# Patient Record
Sex: MALE | Race: WHITE | NOT HISPANIC OR LATINO | URBAN - METROPOLITAN AREA
[De-identification: names, ages, dates, MRNs, and addresses within clinical notes are randomized per-mention and may not be internally consistent; named-entity substitution may affect disease eponyms.]

---

## 2018-10-08 ENCOUNTER — OFFICE (AMBULATORY)
Dept: URBAN - METROPOLITAN AREA CLINIC 64 | Facility: CLINIC | Age: 78
End: 2018-10-08

## 2018-10-08 VITALS
WEIGHT: 268 LBS | HEIGHT: 71 IN | HEART RATE: 73 BPM | DIASTOLIC BLOOD PRESSURE: 72 MMHG | SYSTOLIC BLOOD PRESSURE: 138 MMHG

## 2018-10-08 DIAGNOSIS — Z85.038 PERSONAL HISTORY OF OTHER MALIGNANT NEOPLASM OF LARGE INTEST: ICD-10-CM

## 2018-10-08 PROCEDURE — 99214 OFFICE O/P EST MOD 30 MIN: CPT | Performed by: INTERNAL MEDICINE

## 2020-07-20 ENCOUNTER — TRANSCRIBE ORDERS (OUTPATIENT)
Dept: ADMINISTRATIVE | Facility: HOSPITAL | Age: 80
End: 2020-07-20

## 2020-07-20 DIAGNOSIS — M79.605 BILATERAL LEG PAIN: Primary | ICD-10-CM

## 2020-07-20 DIAGNOSIS — M79.604 BILATERAL LEG PAIN: Primary | ICD-10-CM

## 2021-11-02 ENCOUNTER — OFFICE (AMBULATORY)
Dept: URBAN - METROPOLITAN AREA CLINIC 64 | Facility: CLINIC | Age: 81
End: 2021-11-02

## 2021-11-02 VITALS
WEIGHT: 277 LBS | SYSTOLIC BLOOD PRESSURE: 102 MMHG | DIASTOLIC BLOOD PRESSURE: 50 MMHG | HEART RATE: 76 BPM | HEIGHT: 71 IN

## 2021-11-02 DIAGNOSIS — K57.30 DIVERTICULOSIS OF LARGE INTESTINE WITHOUT PERFORATION OR ABS: ICD-10-CM

## 2021-11-02 DIAGNOSIS — Z85.038 PERSONAL HISTORY OF OTHER MALIGNANT NEOPLASM OF LARGE INTEST: ICD-10-CM

## 2021-11-02 PROCEDURE — 99204 OFFICE O/P NEW MOD 45 MIN: CPT | Performed by: INTERNAL MEDICINE

## 2021-12-17 ENCOUNTER — ON CAMPUS - OUTPATIENT (AMBULATORY)
Dept: URBAN - METROPOLITAN AREA HOSPITAL 2 | Facility: HOSPITAL | Age: 81
End: 2021-12-17
Payer: COMMERCIAL

## 2021-12-17 VITALS
OXYGEN SATURATION: 96 % | SYSTOLIC BLOOD PRESSURE: 86 MMHG | SYSTOLIC BLOOD PRESSURE: 84 MMHG | SYSTOLIC BLOOD PRESSURE: 91 MMHG | SYSTOLIC BLOOD PRESSURE: 92 MMHG | SYSTOLIC BLOOD PRESSURE: 90 MMHG | DIASTOLIC BLOOD PRESSURE: 59 MMHG | TEMPERATURE: 97.1 F | HEART RATE: 83 BPM | DIASTOLIC BLOOD PRESSURE: 55 MMHG | OXYGEN SATURATION: 94 % | WEIGHT: 277 LBS | SYSTOLIC BLOOD PRESSURE: 126 MMHG | HEART RATE: 85 BPM | DIASTOLIC BLOOD PRESSURE: 53 MMHG | OXYGEN SATURATION: 95 % | HEART RATE: 89 BPM | SYSTOLIC BLOOD PRESSURE: 133 MMHG | HEART RATE: 92 BPM | DIASTOLIC BLOOD PRESSURE: 80 MMHG | HEART RATE: 82 BPM | DIASTOLIC BLOOD PRESSURE: 56 MMHG | HEIGHT: 71 IN | RESPIRATION RATE: 16 BRPM | OXYGEN SATURATION: 97 % | HEART RATE: 84 BPM | RESPIRATION RATE: 18 BRPM | DIASTOLIC BLOOD PRESSURE: 57 MMHG | DIASTOLIC BLOOD PRESSURE: 60 MMHG | DIASTOLIC BLOOD PRESSURE: 71 MMHG | SYSTOLIC BLOOD PRESSURE: 101 MMHG | DIASTOLIC BLOOD PRESSURE: 69 MMHG | SYSTOLIC BLOOD PRESSURE: 97 MMHG

## 2021-12-17 DIAGNOSIS — Z85.038 PERSONAL HISTORY OF OTHER MALIGNANT NEOPLASM OF LARGE INTEST: ICD-10-CM

## 2021-12-17 DIAGNOSIS — K57.30 DIVERTICULOSIS OF LARGE INTESTINE WITHOUT PERFORATION OR ABS: ICD-10-CM

## 2021-12-17 PROCEDURE — 45378 DIAGNOSTIC COLONOSCOPY: CPT | Mod: 33 | Performed by: INTERNAL MEDICINE

## 2024-05-25 ENCOUNTER — HOSPITAL ENCOUNTER (OUTPATIENT)
Facility: HOSPITAL | Age: 84
Setting detail: OBSERVATION
Discharge: HOME | End: 2024-05-27
Attending: EMERGENCY MEDICINE | Admitting: INTERNAL MEDICINE
Payer: COMMERCIAL

## 2024-05-25 DIAGNOSIS — R55 SYNCOPE AND COLLAPSE: Primary | ICD-10-CM

## 2024-05-25 LAB
ALBUMIN SERPL-MCNC: 4.1 G/DL (ref 3.5–5.2)
ALBUMIN/GLOB SERPL: 1.5 G/DL
ALP SERPL-CCNC: 88 U/L (ref 39–117)
ALT SERPL W P-5'-P-CCNC: 8 U/L (ref 1–41)
ANION GAP SERPL CALCULATED.3IONS-SCNC: 11.6 MMOL/L (ref 5–15)
AST SERPL-CCNC: 14 U/L (ref 1–40)
BASOPHILS # BLD AUTO: 0.02 10*3/MM3 (ref 0–0.2)
BASOPHILS NFR BLD AUTO: 0.3 % (ref 0–1.5)
BILIRUB SERPL-MCNC: 0.6 MG/DL (ref 0–1.2)
BUN SERPL-MCNC: 21 MG/DL (ref 8–23)
BUN/CREAT SERPL: 19.4 (ref 7–25)
CALCIUM SPEC-SCNC: 10.3 MG/DL (ref 8.6–10.5)
CHLORIDE SERPL-SCNC: 101 MMOL/L (ref 98–107)
CO2 SERPL-SCNC: 24.4 MMOL/L (ref 22–29)
CREAT SERPL-MCNC: 1.08 MG/DL (ref 0.76–1.27)
DEPRECATED RDW RBC AUTO: 47.1 FL (ref 37–54)
EGFRCR SERPLBLD CKD-EPI 2021: 68.1 ML/MIN/1.73
EOSINOPHIL # BLD AUTO: 0.14 10*3/MM3 (ref 0–0.4)
EOSINOPHIL NFR BLD AUTO: 2.3 % (ref 0.3–6.2)
ERYTHROCYTE [DISTWIDTH] IN BLOOD BY AUTOMATED COUNT: 13.8 % (ref 12.3–15.4)
GEN 5 2HR TROPONIN T REFLEX: 20 NG/L
GLOBULIN UR ELPH-MCNC: 2.8 GM/DL
GLUCOSE BLDC GLUCOMTR-MCNC: 184 MG/DL (ref 70–105)
GLUCOSE SERPL-MCNC: 113 MG/DL (ref 65–99)
HCT VFR BLD AUTO: 41.8 % (ref 37.5–51)
HGB BLD-MCNC: 13.3 G/DL (ref 13–17.7)
IMM GRANULOCYTES # BLD AUTO: 0.02 10*3/MM3 (ref 0–0.05)
IMM GRANULOCYTES NFR BLD AUTO: 0.3 % (ref 0–0.5)
LYMPHOCYTES # BLD AUTO: 1.24 10*3/MM3 (ref 0.7–3.1)
LYMPHOCYTES NFR BLD AUTO: 20.3 % (ref 19.6–45.3)
MCH RBC QN AUTO: 29.8 PG (ref 26.6–33)
MCHC RBC AUTO-ENTMCNC: 31.8 G/DL (ref 31.5–35.7)
MCV RBC AUTO: 93.5 FL (ref 79–97)
MONOCYTES # BLD AUTO: 0.64 10*3/MM3 (ref 0.1–0.9)
MONOCYTES NFR BLD AUTO: 10.5 % (ref 5–12)
NEUTROPHILS NFR BLD AUTO: 4.05 10*3/MM3 (ref 1.7–7)
NEUTROPHILS NFR BLD AUTO: 66.3 % (ref 42.7–76)
NRBC BLD AUTO-RTO: 0 /100 WBC (ref 0–0.2)
NT-PROBNP SERPL-MCNC: 85.2 PG/ML (ref 0–1800)
PLATELET # BLD AUTO: 222 10*3/MM3 (ref 140–450)
PMV BLD AUTO: 11.4 FL (ref 6–12)
POTASSIUM SERPL-SCNC: 3.9 MMOL/L (ref 3.5–5.2)
PROT SERPL-MCNC: 6.9 G/DL (ref 6–8.5)
RBC # BLD AUTO: 4.47 10*6/MM3 (ref 4.14–5.8)
SODIUM SERPL-SCNC: 137 MMOL/L (ref 136–145)
TROPONIN T DELTA: -1 NG/L
TROPONIN T SERPL HS-MCNC: 21 NG/L
WBC NRBC COR # BLD AUTO: 6.11 10*3/MM3 (ref 3.4–10.8)

## 2024-05-25 PROCEDURE — 83880 ASSAY OF NATRIURETIC PEPTIDE: CPT | Performed by: EMERGENCY MEDICINE

## 2024-05-25 PROCEDURE — G0378 HOSPITAL OBSERVATION PER HR: HCPCS

## 2024-05-25 PROCEDURE — 85025 COMPLETE CBC W/AUTO DIFF WBC: CPT | Performed by: EMERGENCY MEDICINE

## 2024-05-25 PROCEDURE — 93005 ELECTROCARDIOGRAM TRACING: CPT | Performed by: EMERGENCY MEDICINE

## 2024-05-25 PROCEDURE — 82948 REAGENT STRIP/BLOOD GLUCOSE: CPT

## 2024-05-25 PROCEDURE — 96372 THER/PROPH/DIAG INJ SC/IM: CPT

## 2024-05-25 PROCEDURE — 80053 COMPREHEN METABOLIC PANEL: CPT | Performed by: EMERGENCY MEDICINE

## 2024-05-25 PROCEDURE — 36415 COLL VENOUS BLD VENIPUNCTURE: CPT

## 2024-05-25 PROCEDURE — 93005 ELECTROCARDIOGRAM TRACING: CPT

## 2024-05-25 PROCEDURE — 99285 EMERGENCY DEPT VISIT HI MDM: CPT

## 2024-05-25 PROCEDURE — 84484 ASSAY OF TROPONIN QUANT: CPT | Performed by: EMERGENCY MEDICINE

## 2024-05-25 PROCEDURE — 25010000002 ENOXAPARIN PER 10 MG: Performed by: NURSE PRACTITIONER

## 2024-05-25 RX ORDER — DILTIAZEM HYDROCHLORIDE 240 MG/1
240 CAPSULE, COATED, EXTENDED RELEASE ORAL
Status: DISCONTINUED | OUTPATIENT
Start: 2024-05-26 | End: 2024-05-27

## 2024-05-25 RX ORDER — AMOXICILLIN 250 MG
2 CAPSULE ORAL 2 TIMES DAILY PRN
Status: DISCONTINUED | OUTPATIENT
Start: 2024-05-25 | End: 2024-05-27 | Stop reason: HOSPADM

## 2024-05-25 RX ORDER — POLYETHYLENE GLYCOL 3350 17 G/17G
17 POWDER, FOR SOLUTION ORAL DAILY PRN
Status: DISCONTINUED | OUTPATIENT
Start: 2024-05-25 | End: 2024-05-27 | Stop reason: HOSPADM

## 2024-05-25 RX ORDER — HYDROCHLOROTHIAZIDE 25 MG/1
25 TABLET ORAL DAILY
Status: DISCONTINUED | OUTPATIENT
Start: 2024-05-26 | End: 2024-05-26

## 2024-05-25 RX ORDER — INSULIN LISPRO 100 [IU]/ML
2-9 INJECTION, SOLUTION INTRAVENOUS; SUBCUTANEOUS
Status: DISCONTINUED | OUTPATIENT
Start: 2024-05-26 | End: 2024-05-26

## 2024-05-25 RX ORDER — NICOTINE POLACRILEX 4 MG
15 LOZENGE BUCCAL
Status: DISCONTINUED | OUTPATIENT
Start: 2024-05-25 | End: 2024-05-27 | Stop reason: HOSPADM

## 2024-05-25 RX ORDER — LISINOPRIL 20 MG/1
20 TABLET ORAL DAILY
Status: DISCONTINUED | OUTPATIENT
Start: 2024-05-26 | End: 2024-05-27

## 2024-05-25 RX ORDER — OMEPRAZOLE 40 MG/1
40 CAPSULE, DELAYED RELEASE ORAL DAILY
COMMUNITY

## 2024-05-25 RX ORDER — HYDROCHLOROTHIAZIDE 25 MG/1
25 TABLET ORAL DAILY
COMMUNITY
End: 2024-05-27 | Stop reason: HOSPADM

## 2024-05-25 RX ORDER — NITROGLYCERIN 0.4 MG/1
0.4 TABLET SUBLINGUAL
Status: DISCONTINUED | OUTPATIENT
Start: 2024-05-25 | End: 2024-05-27 | Stop reason: HOSPADM

## 2024-05-25 RX ORDER — GABAPENTIN 400 MG/1
400 CAPSULE ORAL 2 TIMES DAILY
COMMUNITY
End: 2024-05-27 | Stop reason: HOSPADM

## 2024-05-25 RX ORDER — ACETAMINOPHEN 325 MG/1
650 TABLET ORAL EVERY 4 HOURS PRN
Status: DISCONTINUED | OUTPATIENT
Start: 2024-05-25 | End: 2024-05-27 | Stop reason: HOSPADM

## 2024-05-25 RX ORDER — TAMSULOSIN HYDROCHLORIDE 0.4 MG/1
1 CAPSULE ORAL DAILY
COMMUNITY

## 2024-05-25 RX ORDER — DILTIAZEM HYDROCHLORIDE 240 MG/1
240 CAPSULE, EXTENDED RELEASE ORAL DAILY
COMMUNITY
End: 2024-05-27 | Stop reason: HOSPADM

## 2024-05-25 RX ORDER — DEXTROSE MONOHYDRATE 25 G/50ML
25 INJECTION, SOLUTION INTRAVENOUS
Status: DISCONTINUED | OUTPATIENT
Start: 2024-05-25 | End: 2024-05-27 | Stop reason: HOSPADM

## 2024-05-25 RX ORDER — TAMSULOSIN HYDROCHLORIDE 0.4 MG/1
0.4 CAPSULE ORAL DAILY
Status: DISCONTINUED | OUTPATIENT
Start: 2024-05-26 | End: 2024-05-26

## 2024-05-25 RX ORDER — SODIUM CHLORIDE 0.9 % (FLUSH) 0.9 %
10 SYRINGE (ML) INJECTION AS NEEDED
Status: DISCONTINUED | OUTPATIENT
Start: 2024-05-25 | End: 2024-05-27 | Stop reason: HOSPADM

## 2024-05-25 RX ORDER — ONDANSETRON 4 MG/1
4 TABLET, ORALLY DISINTEGRATING ORAL EVERY 6 HOURS PRN
Status: DISCONTINUED | OUTPATIENT
Start: 2024-05-25 | End: 2024-05-27 | Stop reason: HOSPADM

## 2024-05-25 RX ORDER — BISACODYL 10 MG
10 SUPPOSITORY, RECTAL RECTAL DAILY PRN
Status: DISCONTINUED | OUTPATIENT
Start: 2024-05-25 | End: 2024-05-27 | Stop reason: HOSPADM

## 2024-05-25 RX ORDER — ENOXAPARIN SODIUM 100 MG/ML
40 INJECTION SUBCUTANEOUS DAILY
Status: DISCONTINUED | OUTPATIENT
Start: 2024-05-25 | End: 2024-05-27 | Stop reason: HOSPADM

## 2024-05-25 RX ORDER — BISACODYL 5 MG/1
5 TABLET, DELAYED RELEASE ORAL DAILY PRN
Status: DISCONTINUED | OUTPATIENT
Start: 2024-05-25 | End: 2024-05-27 | Stop reason: HOSPADM

## 2024-05-25 RX ORDER — SODIUM CHLORIDE 0.9 % (FLUSH) 0.9 %
10 SYRINGE (ML) INJECTION EVERY 12 HOURS SCHEDULED
Status: DISCONTINUED | OUTPATIENT
Start: 2024-05-25 | End: 2024-05-27 | Stop reason: HOSPADM

## 2024-05-25 RX ORDER — IBUPROFEN 600 MG/1
1 TABLET ORAL
Status: DISCONTINUED | OUTPATIENT
Start: 2024-05-25 | End: 2024-05-27 | Stop reason: HOSPADM

## 2024-05-25 RX ORDER — PANTOPRAZOLE SODIUM 40 MG/1
40 TABLET, DELAYED RELEASE ORAL
Status: DISCONTINUED | OUTPATIENT
Start: 2024-05-26 | End: 2024-05-27 | Stop reason: HOSPADM

## 2024-05-25 RX ORDER — SODIUM CHLORIDE 9 MG/ML
40 INJECTION, SOLUTION INTRAVENOUS AS NEEDED
Status: DISCONTINUED | OUTPATIENT
Start: 2024-05-25 | End: 2024-05-27 | Stop reason: HOSPADM

## 2024-05-25 RX ORDER — GABAPENTIN 400 MG/1
400 CAPSULE ORAL 2 TIMES DAILY
Status: DISCONTINUED | OUTPATIENT
Start: 2024-05-26 | End: 2024-05-26

## 2024-05-25 RX ORDER — LISINOPRIL 20 MG/1
20 TABLET ORAL DAILY
COMMUNITY
End: 2024-05-27 | Stop reason: HOSPADM

## 2024-05-25 RX ADMIN — ENOXAPARIN SODIUM 40 MG: 100 INJECTION SUBCUTANEOUS at 19:10

## 2024-05-25 NOTE — ED NOTES
Pt reports multiple syncopal episodes over the past couple days, just had one today. Pts brother caught pt and sat him down before he lost consciousness. Pts family at bedside denies hitting head. Pt states he takes ASA but denies other blood thinner use.

## 2024-05-25 NOTE — Clinical Note
Level of Care: Telemetry [5]   Admitting Physician: IAIN CAMPBELL [[id number]]   Attending Physician: IAIN CAMPBELL [[id number]]

## 2024-05-25 NOTE — ED PROVIDER NOTES
"Subjective   History of Present Illness  84-year-old male with history of CAD and CHF presents for 3 syncopal episodes last 4 days.  Most recent 1 this morning.  Blood pressure 80/40 per EMS when they arrived.  Improved without issue.  Reportedly it was not breathing yesterday or today when it occurred.  States he has a history of proximal A-fib as well but does not have any chest pain shortness of breath or palpitations.  Review of Systems  HPI  No past medical history on file.    Allergies   Allergen Reactions    Hydrocodone Unknown - High Severity    Tetanus Toxoids Unknown - High Severity       No past surgical history on file.    No family history on file.    Social History     Socioeconomic History    Marital status:            Objective   Physical Exam  Constitutional:  No acute distress.  Head:  Atraumatic.  Normocephalic.   Eyes:  No scleral icterus. Normal conjunctivae  ENT:  Moist mucosa.  No nasal discharge present.  Cardiovascular:  Well perfused.  Equal pulses.  Regular rhythm and normal rate.  Normal capillary refill.    Pulmonary/Chest:  No respiratory distress.  Airway patent.  No tachypnea.  No accessory muscle usage.  Clear to station bilaterally  Abdominal:  Nondistended. Nontender.   Extremities: Trace peripheral edema.  No Deformity  Skin:  Warm, dry  Neurological:  Alert, awake, and appropriate.  Normal speech.      Procedures           ED Course      /59 (Patient Position: Standing)   Pulse 84   Temp 97.3 °F (36.3 °C) (Oral)   Resp 17   Ht 180.3 cm (71\")   Wt 109 kg (240 lb)   SpO2 92%   BMI 33.47 kg/m²   Labs Reviewed   COMPREHENSIVE METABOLIC PANEL - Abnormal; Notable for the following components:       Result Value    Glucose 113 (*)     All other components within normal limits    Narrative:     GFR Normal >60  Chronic Kidney Disease <60  Kidney Failure <15    The GFR formula is only valid for adults with stable renal function between ages 18 and 70.   TROPONIN - " Normal    Narrative:     High Sensitive Troponin T Reference Range:  <14.0 ng/L- Negative Female for AMI  <22.0 ng/L- Negative Male for AMI  >=14 - Abnormal Female indicating possible myocardial injury.  >=22 - Abnormal Male indicating possible myocardial injury.   Clinicians would have to utilize clinical acumen, EKG, Troponin, and serial changes to determine if it is an Acute Myocardial Infarction or myocardial injury due to an underlying chronic condition.        CBC WITH AUTO DIFFERENTIAL - Normal   BNP (IN-HOUSE) - Normal    Narrative:     This assay is used as an aid in the diagnosis of individuals suspected of having heart failure. It can be used as an aid in the diagnosis of acute decompensated heart failure (ADHF) in patients presenting with signs and symptoms of ADHF to the emergency department (ED). In addition, NT-proBNP of <300 pg/mL indicates ADHF is not likely.    Age Range Result Interpretation  NT-proBNP Concentration (pg/mL:      <50             Positive            >450                   Gray                 300-450                    Negative             <300    50-75           Positive            >900                  Gray                300-900                  Negative            <300      >75             Positive            >1800                  Gray                300-1800                  Negative            <300   HIGH SENSITIVITIY TROPONIN T 2HR   CBC AND DIFFERENTIAL    Narrative:     The following orders were created for panel order CBC & Differential.  Procedure                               Abnormality         Status                     ---------                               -----------         ------                     CBC Auto Differential[[id number]]        Normal              Final result                 Please view results for these tests on the individual orders.     Medications   sodium chloride 0.9 % flush 10 mL (has no administration in time range)     No radiology results  for the last day                                         Medical Decision Making  Amount and/or Complexity of Data Reviewed  Labs: ordered.  ECG/medicine tests: ordered.    Risk  Prescription drug management.    EKG interpretation: 3:47 PM, rate 76 normal sinus rhythm, first-degree block, normal axis, normal QTc, no acute ischemic changes.    Negative orthostatics.  Reassuring labs.  Given history of CHF patient high risk for both arrhythmia and structural heart disease.  Excepted by Radha thompson to [employer] service    Final diagnoses:   Syncope and collapse       ED Disposition  ED Disposition       ED Disposition   Decision to Admit    Condition   --    Comment   Level of Care: Telemetry [5]   Admitting Physician: IAIN CAMPBELL [[id number]]   Attending Physician: IAIN CAMPBELL [[id number]]                 No follow-up provider specified.       Medication List      No changes were made to your prescriptions during this visit.            Dariel Mcgregor MD  05/25/24 [id number]

## 2024-05-26 ENCOUNTER — APPOINTMENT (OUTPATIENT)
Dept: GENERAL RADIOLOGY | Facility: HOSPITAL | Age: 84
End: 2024-05-26
Payer: COMMERCIAL

## 2024-05-26 PROBLEM — I25.10 CORONARY ARTERY DISEASE: Status: ACTIVE | Noted: 2024-05-26

## 2024-05-26 PROBLEM — M17.12 ARTHRITIS OF KNEE, LEFT: Status: ACTIVE | Noted: 2023-11-29

## 2024-05-26 LAB
BACTERIA UR QL AUTO: NORMAL /HPF
BILIRUB UR QL STRIP: NEGATIVE
CLARITY UR: CLEAR
COLOR UR: YELLOW
GLUCOSE BLDC GLUCOMTR-MCNC: 109 MG/DL (ref 70–105)
GLUCOSE BLDC GLUCOMTR-MCNC: 120 MG/DL (ref 70–105)
GLUCOSE BLDC GLUCOMTR-MCNC: 146 MG/DL (ref 70–105)
GLUCOSE BLDC GLUCOMTR-MCNC: 161 MG/DL (ref 70–105)
GLUCOSE UR STRIP-MCNC: NEGATIVE MG/DL
HGB UR QL STRIP.AUTO: NEGATIVE
HYALINE CASTS UR QL AUTO: NORMAL /LPF
KETONES UR QL STRIP: NEGATIVE
LEUKOCYTE ESTERASE UR QL STRIP.AUTO: ABNORMAL
NITRITE UR QL STRIP: NEGATIVE
PH UR STRIP.AUTO: 6.5 [PH] (ref 5–8)
PROT UR QL STRIP: NEGATIVE
QT INTERVAL: 388 MS
QTC INTERVAL: 436 MS
RBC # UR STRIP: NORMAL /HPF
REF LAB TEST METHOD: NORMAL
SP GR UR STRIP: 1.02 (ref 1–1.03)
SQUAMOUS #/AREA URNS HPF: NORMAL /HPF
UROBILINOGEN UR QL STRIP: ABNORMAL
WBC # UR STRIP: NORMAL /HPF

## 2024-05-26 PROCEDURE — 81001 URINALYSIS AUTO W/SCOPE: CPT | Performed by: FAMILY MEDICINE

## 2024-05-26 PROCEDURE — 25010000002 ENOXAPARIN PER 10 MG: Performed by: NURSE PRACTITIONER

## 2024-05-26 PROCEDURE — 82948 REAGENT STRIP/BLOOD GLUCOSE: CPT

## 2024-05-26 PROCEDURE — G0378 HOSPITAL OBSERVATION PER HR: HCPCS

## 2024-05-26 PROCEDURE — 82948 REAGENT STRIP/BLOOD GLUCOSE: CPT | Performed by: NURSE PRACTITIONER

## 2024-05-26 PROCEDURE — 63710000001 INSULIN LISPRO (HUMAN) PER 5 UNITS: Performed by: NURSE PRACTITIONER

## 2024-05-26 PROCEDURE — 71045 X-RAY EXAM CHEST 1 VIEW: CPT

## 2024-05-26 PROCEDURE — 25810000003 SODIUM CHLORIDE 0.9 % SOLUTION: Performed by: FAMILY MEDICINE

## 2024-05-26 PROCEDURE — 96372 THER/PROPH/DIAG INJ SC/IM: CPT

## 2024-05-26 PROCEDURE — 99204 OFFICE O/P NEW MOD 45 MIN: CPT | Performed by: STUDENT IN AN ORGANIZED HEALTH CARE EDUCATION/TRAINING PROGRAM

## 2024-05-26 RX ORDER — GABAPENTIN 100 MG/1
100 CAPSULE ORAL 2 TIMES DAILY
Status: DISCONTINUED | OUTPATIENT
Start: 2024-05-26 | End: 2024-05-27 | Stop reason: HOSPADM

## 2024-05-26 RX ORDER — ASPIRIN 81 MG/1
81 TABLET ORAL DAILY
Status: DISCONTINUED | OUTPATIENT
Start: 2024-05-26 | End: 2024-05-27 | Stop reason: HOSPADM

## 2024-05-26 RX ORDER — SODIUM CHLORIDE 9 MG/ML
20 INJECTION, SOLUTION INTRAVENOUS CONTINUOUS
Status: DISCONTINUED | OUTPATIENT
Start: 2024-05-26 | End: 2024-05-27 | Stop reason: HOSPADM

## 2024-05-26 RX ORDER — INSULIN LISPRO 100 [IU]/ML
2-9 INJECTION, SOLUTION INTRAVENOUS; SUBCUTANEOUS
Status: DISCONTINUED | OUTPATIENT
Start: 2024-05-26 | End: 2024-05-27 | Stop reason: HOSPADM

## 2024-05-26 RX ADMIN — SODIUM CHLORIDE 20 ML/HR: 9 INJECTION, SOLUTION INTRAVENOUS at 10:10

## 2024-05-26 RX ADMIN — LISINOPRIL 20 MG: 20 TABLET ORAL at 09:23

## 2024-05-26 RX ADMIN — Medication 10 ML: at 09:24

## 2024-05-26 RX ADMIN — GABAPENTIN 100 MG: 100 CAPSULE ORAL at 21:25

## 2024-05-26 RX ADMIN — ENOXAPARIN SODIUM 40 MG: 100 INJECTION SUBCUTANEOUS at 17:39

## 2024-05-26 RX ADMIN — GABAPENTIN 400 MG: 400 CAPSULE ORAL at 00:25

## 2024-05-26 RX ADMIN — DILTIAZEM HYDROCHLORIDE 240 MG: 240 CAPSULE, EXTENDED RELEASE ORAL at 09:23

## 2024-05-26 RX ADMIN — INSULIN LISPRO 2 UNITS: 100 INJECTION, SOLUTION INTRAVENOUS; SUBCUTANEOUS at 11:56

## 2024-05-26 RX ADMIN — Medication 10 ML: at 00:25

## 2024-05-26 RX ADMIN — ASPIRIN 81 MG: 81 TABLET, COATED ORAL at 17:39

## 2024-05-26 RX ADMIN — GABAPENTIN 100 MG: 100 CAPSULE ORAL at 09:23

## 2024-05-26 RX ADMIN — INSULIN LISPRO 2 UNITS: 100 INJECTION, SOLUTION INTRAVENOUS; SUBCUTANEOUS at 00:24

## 2024-05-26 RX ADMIN — Medication 10 ML: at 21:25

## 2024-05-26 RX ADMIN — PANTOPRAZOLE SODIUM 40 MG: 40 TABLET, DELAYED RELEASE ORAL at 06:34

## 2024-05-26 RX ADMIN — HYDROCHLOROTHIAZIDE 25 MG: 25 TABLET ORAL at 09:23

## 2024-05-26 RX ADMIN — METFORMIN HYDROCHLORIDE 500 MG: 500 TABLET ORAL at 10:02

## 2024-05-26 RX ADMIN — METFORMIN HYDROCHLORIDE 500 MG: 500 TABLET ORAL at 17:39

## 2024-05-26 NOTE — PLAN OF CARE
Goal Outcome Evaluation:         Pt is transferring to the room 357. Reported to the nurse there.

## 2024-05-26 NOTE — PROGRESS NOTES
Referring Provider: Daniel Coates MD        History of present illness:    Pleasant 84-year-old gentleman with a past medical history of coronary artery disease status post CABG in [place] about 7 years back, history of A-fib status post Maze procedure on full dose aspirin, hypertension presented to [facility] in setting of recurrent syncope  Patient follows with Dr. Tillman and had seen him about a year back.  No recent medication changes or viral illness.  Reports episode of feeling headache in the occipital region which radiated to the front along with vision changes and subsequently feeling of passing out.  Had a similar episode while at home sitting on the table.  Unclear if he actually lost consciousness however does not remember much from the incident  Blood pressure was in the 80s on evaluation    Review of systems: Negative unless as noted in HPI    Personal History:      History reviewed. No pertinent past medical history.    History reviewed. No pertinent surgical history.    History reviewed. No pertinent family history.    Social History     Tobacco Use    Smoking status: Never     Passive exposure: Never    Smokeless tobacco: Never   Vaping Use    Vaping status: Never Used   Substance Use Topics    Alcohol use: Never    Drug use: Never        Home meds:  Prior to Admission medications    Medication Sig Start Date End Date Taking? Authorizing Provider   dilTIAZem XR (DILACOR XR) 240 MG 24 hr capsule Take 1 capsule by mouth Daily.   Yes ProviderLoly MD   gabapentin (NEURONTIN) 400 MG capsule Take 1 capsule by mouth 2 (Two) Times a Day.   Yes ProviderLoly MD   hydroCHLOROthiazide 25 MG tablet Take 1 tablet by mouth Daily.   Yes ProviderLoly MD   lisinopril (PRINIVIL,ZESTRIL) 20 MG tablet Take 1 tablet by mouth Daily.   Yes ProviderLoly MD   metFORMIN (GLUCOPHAGE) 500 MG tablet Take 1 tablet by mouth 2 (Two) Times a Day With Meals.   Yes Provider  "Historical, MD   omeprazole (priLOSEC) 40 MG capsule Take 1 capsule by mouth Daily.   Yes Provider, MD Loly   tamsulosin (FLOMAX) 0.4 MG capsule 24 hr capsule Take 1 capsule by mouth Daily.   Yes Provider, Loly, MD       Allergies:     Hydrocodone and Tetanus toxoids    Scheduled Meds:dilTIAZem CD, 240 mg, Oral, Q24H  enoxaparin, 40 mg, Subcutaneous, Daily  gabapentin, 100 mg, Oral, BID  insulin lispro, 2-9 Units, Subcutaneous, 4x Daily AC & at Bedtime  lisinopril, 20 mg, Oral, Daily  metFORMIN, 500 mg, Oral, BID With Meals  pantoprazole, 40 mg, Oral, Q AM  sodium chloride, 10 mL, Intravenous, Q12H      Continuous Infusions:sodium chloride, 20 mL/hr, Last Rate: 20 mL/hr (05/26/24 1010)      PRN Meds:  acetaminophen    senna-docusate sodium **AND** polyethylene glycol **AND** bisacodyl **AND** bisacodyl    Calcium Replacement - Follow Nurse / BPA Driven Protocol    dextrose    dextrose    glucagon (human recombinant)    Magnesium Standard Dose Replacement - Follow Nurse / BPA Driven Protocol    nitroglycerin    ondansetron ODT    Phosphorus Replacement - Follow Nurse / BPA Driven Protocol    Potassium Replacement - Follow Nurse / BPA Driven Protocol    [COMPLETED] Insert Peripheral IV **AND** sodium chloride    sodium chloride    sodium chloride      OBJECTIVE    Vital Signs  Vitals:    05/26/24 0932 05/26/24 0934 05/26/24 1157 05/26/24 1500   BP: 128/67 117/65 123/64 147/86   BP Location: Right arm Right arm Right arm Right arm   Patient Position: Sitting Standing Lying Lying   Pulse: 79 92  78   Resp: 10 16 17 22   Temp:  98 °F (36.7 °C) 97.8 °F (36.6 °C) 97.5 °F (36.4 °C)   TempSrc:   Oral Oral   SpO2: 96% 95%  93%   Weight:       Height:           Flowsheet Rows      Flowsheet Row First Filed Value   Admission Height 180.3 cm (71\") Documented at 05/25/2024 1548   Admission Weight 109 kg (240 lb) Documented at 05/25/2024 1548              Intake/Output Summary (Last 24 hours) at 5/26/2024 1706  Last " data filed at 5/26/2024 1300  Gross per 24 hour   Intake 240 ml   Output 975 ml   Net -735 ml            Physical Exam:  General-no acute distress  Cardiovascular-S1-S2 normal, no murmur  Respiratory-clear to auscultation, no wheezing  GI-soft, nontender  No pedal edema        BNP        TROPONIN  Results from last 7 days   Lab Units 05/25/24  1909   HSTROP T ng/L 20           ABG          EKG  I personally viewed and interpreted the patient's EKG/Telemetry data:  ECG 12 Lead Syncope   Final Result   HEART RATE= 76  bpm   RR Interval= 792  ms   DE Interval= 214  ms   P Horizontal Axis= 38  deg   P Front Axis= 35  deg   QRSD Interval= 106  ms   QT Interval= 388  ms   QTcB= 436  ms   QRS Axis= 56  deg   T Wave Axis= 45  deg   - ABNORMAL ECG -   Sinus rhythm   Borderline prolonged DE interval   Inferior infarct, old   When compared with ECG of 08-Oct-2015 14:18:07,   New or worsened ischemia or infarction   Significant repolarization change   Electronically Signed By: Dariel Mcgregor (JANICE) 26-May-2024 07:51:53   Date and Time of Study: 2024-05-25 15:47:52            Echocardiogram:          Stress Test:        Cardiac Catheterization:  No results found for this or any previous visit.        Other:      ASSESSMENT & PLAN:    Recurrent syncope  Patient has a history of CABG about 7 years back  Also has a history of atrial fibrillation  Check echocardiogram, check carotid ultrasound duplex and nuclear stress test  Consider neurology consult    Coronary artery disease status post CABG 7 years back in [place]  Check echocardiogram and stress test as above  Continue baby aspirin    History of atrial fibrillation status post Maze procedure  Not on anticoagulation, maintained on full dose aspirin as per primary cardiologist    History of hypertension  Reported to be hypotensive on evaluation  Continue current diltiazem and lisinopril for now    Part of this note may be an electronic transcription/translation of spoken  language to printed text using the Dragon Dictation System.    Justo Cooper MD  05/26/24  17:06 EDT

## 2024-05-26 NOTE — PLAN OF CARE
Goal Outcome Evaluation:  Plan of Care Reviewed With: patient        Progress: no change  Outcome Evaluation: Patient resting in bed throughout shift. transferred from ER this afternoon. no complaints.

## 2024-05-26 NOTE — H&P
Patient Care Team:  Flora Degroot MD as PCP - General (Internal Medicine)    Chief Complaint  Subjective     The patient is a 84 y.o. male who presents with acute syncope    HPI  The patient was in his usual state of health until the day of presentation when he had an episode of recurrent syncope.  He was sitting at a table and lost consciousness.  He denied palpitations substernal chest pain aura loss of bowel or bladder or other.  EMS was called and the patient was brought to the [facility] emergency room for evaluation.  He was found to have soft blood pressure in the 80s.  At the time my evaluation he is feeling better but continues to have orthostatic blood pressure changes.  Alpha blockade has been discontinued.  He denies other constitutional complaints and remains in good spirits.  Review of Systems  Review of Systems   Neurological:  Positive for syncope.       History  No past medical history on file.  No past surgical history on file.  No family history on file.     Allergies:  Hydrocodone and Tetanus toxoids    Objective     Vital Signs  Temp:  [97.3 °F (36.3 °C)-97.7 °F (36.5 °C)] 97.7 °F (36.5 °C)  Heart Rate:  [70-93] 92  Resp:  [10-17] 16  BP: (117-148)/(59-84) 117/65      Physical Exam:   Physical Exam  Vitals reviewed.   Constitutional:       Appearance: Normal appearance.   Cardiovascular:      Rate and Rhythm: Regular rhythm.      Heart sounds: Normal heart sounds.   Pulmonary:      Breath sounds: Normal breath sounds.   Skin:     General: Skin is warm.   Neurological:      General: No focal deficit present.      Mental Status: He is alert.              Results Review:   CBC    Results from last 7 days   Lab Units 05/25/24  1610   WBC 10*3/mm3 6.11   HEMOGLOBIN g/dL 13.3   PLATELETS 10*3/mm3 222     BMP   Results from last 7 days   Lab Units 05/25/24  1651   SODIUM mmol/L 137   POTASSIUM mmol/L 3.9   CHLORIDE mmol/L 101   CO2 mmol/L 24.4   BUN mg/dL 21   CREATININE mg/dL 1.08   GLUCOSE  mg/dL 113*     Cr Clearance Estimated Creatinine Clearance: 65.1 mL/min (by C-G formula based on SCr of 1.08 mg/dL).  Coag     HbA1C   Lab Results   Component Value Date    HGBA1C 6.0 (H) 03/29/2024     Blood Glucose   Glucose   Date/Time Value Ref Range Status   05/26/2024 0714 120 (H) 70 - 105 mg/dL Final     Comment:     Serial Number: [id number]:  [id number]   05/25/2024 2345 184 (H) 70 - 105 mg/dL Final     Comment:     Serial Number: [id number]:  [id number]     Infection     CMP   Results from last 7 days   Lab Units 05/25/24  1651   SODIUM mmol/L 137   POTASSIUM mmol/L 3.9   CHLORIDE mmol/L 101   CO2 mmol/L 24.4   BUN mg/dL 21   CREATININE mg/dL 1.08   GLUCOSE mg/dL 113*   ALBUMIN g/dL 4.1   BILIRUBIN mg/dL 0.6   ALK PHOS U/L 88   AST (SGOT) U/L 14   ALT (SGPT) U/L 8     UA      Radiology(recent) No radiology results for the last day     Assessment:      Syncope    Acute syncope  Acute hypotension  Orthostatic hypotension  Atherosclerotic heart disease of coronary arteries of native heart with angina pectoris  History of coronary artery bypass grafting  History of maze  Chronic disease stage II  Hypertension with chronic disease stage II  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     Daniel Coates MD  05/26/24  10:05 EDT

## 2024-05-26 NOTE — PLAN OF CARE
Goal Outcome Evaluation:            Called cardiology for consult. Waiting for call back from Dr Cooper. Checked and recorded orthostatic BP. Pt denied dizziness.

## 2024-05-27 ENCOUNTER — APPOINTMENT (OUTPATIENT)
Dept: NUCLEAR MEDICINE | Facility: HOSPITAL | Age: 84
End: 2024-05-27
Payer: COMMERCIAL

## 2024-05-27 ENCOUNTER — APPOINTMENT (OUTPATIENT)
Dept: CARDIOLOGY | Facility: HOSPITAL | Age: 84
End: 2024-05-27
Payer: COMMERCIAL

## 2024-05-27 ENCOUNTER — APPOINTMENT (OUTPATIENT)
Dept: RESPIRATORY THERAPY | Facility: HOSPITAL | Age: 84
End: 2024-05-27
Payer: COMMERCIAL

## 2024-05-27 VITALS
TEMPERATURE: 97.4 F | HEART RATE: 81 BPM | DIASTOLIC BLOOD PRESSURE: 87 MMHG | SYSTOLIC BLOOD PRESSURE: 133 MMHG | RESPIRATION RATE: 13 BRPM | WEIGHT: 240 LBS | HEIGHT: 71 IN | OXYGEN SATURATION: 93 % | BODY MASS INDEX: 33.6 KG/M2

## 2024-05-27 LAB
BH CV ECHO LEFT VENTRICLE GLOBAL LONGITUDINAL STRAIN: -14.5 %
BH CV ECHO MEAS - AO MAX PG: 5.5 MMHG
BH CV ECHO MEAS - AO MEAN PG: 4 MMHG
BH CV ECHO MEAS - AO V2 MAX: 117 CM/SEC
BH CV ECHO MEAS - AO V2 VTI: 23.2 CM
BH CV ECHO MEAS - AVA(I,D): 3 CM2
BH CV ECHO MEAS - EDV(CUBED): 79.5 ML
BH CV ECHO MEAS - EDV(MOD-SP2): 95.2 ML
BH CV ECHO MEAS - EDV(MOD-SP4): 129 ML
BH CV ECHO MEAS - EF(MOD-BP): 55.8 %
BH CV ECHO MEAS - EF(MOD-SP2): 53.8 %
BH CV ECHO MEAS - EF(MOD-SP4): 55.7 %
BH CV ECHO MEAS - ESV(CUBED): 17.6 ML
BH CV ECHO MEAS - ESV(MOD-SP2): 44 ML
BH CV ECHO MEAS - ESV(MOD-SP4): 57.2 ML
BH CV ECHO MEAS - FS: 39.5 %
BH CV ECHO MEAS - IVS/LVPW: 0.91 CM
BH CV ECHO MEAS - IVSD: 1 CM
BH CV ECHO MEAS - LA DIMENSION: 4.8 CM
BH CV ECHO MEAS - LAT PEAK E' VEL: 7.6 CM/SEC
BH CV ECHO MEAS - LV DIASTOLIC VOL/BSA (35-75): 56.6 CM2
BH CV ECHO MEAS - LV MASS(C)D: 152.6 GRAMS
BH CV ECHO MEAS - LV MAX PG: 2.5 MMHG
BH CV ECHO MEAS - LV MEAN PG: 1 MMHG
BH CV ECHO MEAS - LV SYSTOLIC VOL/BSA (12-30): 25.1 CM2
BH CV ECHO MEAS - LV V1 MAX: 79 CM/SEC
BH CV ECHO MEAS - LV V1 VTI: 15.3 CM
BH CV ECHO MEAS - LVIDD: 4.3 CM
BH CV ECHO MEAS - LVIDS: 2.6 CM
BH CV ECHO MEAS - LVOT AREA: 4.5 CM2
BH CV ECHO MEAS - LVOT DIAM: 2.4 CM
BH CV ECHO MEAS - LVPWD: 1.1 CM
BH CV ECHO MEAS - MED PEAK E' VEL: 6.6 CM/SEC
BH CV ECHO MEAS - MV A MAX VEL: 79.1 CM/SEC
BH CV ECHO MEAS - MV DEC SLOPE: 310 CM/SEC2
BH CV ECHO MEAS - MV DEC TIME: 0.2 SEC
BH CV ECHO MEAS - MV E MAX VEL: 76 CM/SEC
BH CV ECHO MEAS - MV E/A: 0.96
BH CV ECHO MEAS - MV MAX PG: 3.1 MMHG
BH CV ECHO MEAS - MV MEAN PG: 2 MMHG
BH CV ECHO MEAS - MV P1/2T: 75.2 MSEC
BH CV ECHO MEAS - MV V2 VTI: 22.8 CM
BH CV ECHO MEAS - MVA(P1/2T): 2.9 CM2
BH CV ECHO MEAS - MVA(VTI): 3 CM2
BH CV ECHO MEAS - PA V2 MAX: 95.5 CM/SEC
BH CV ECHO MEAS - RAP SYSTOLE: 3 MMHG
BH CV ECHO MEAS - RV MAX PG: 2.4 MMHG
BH CV ECHO MEAS - RV V1 MAX: 77.4 CM/SEC
BH CV ECHO MEAS - RV V1 VTI: 12.7 CM
BH CV ECHO MEAS - RVDD: 3.4 CM
BH CV ECHO MEAS - RVSP: 17.6 MMHG
BH CV ECHO MEAS - SV(LVOT): 69.2 ML
BH CV ECHO MEAS - SV(MOD-SP2): 51.2 ML
BH CV ECHO MEAS - SV(MOD-SP4): 71.8 ML
BH CV ECHO MEAS - SVI(LVOT): 30.4 ML/M2
BH CV ECHO MEAS - SVI(MOD-SP2): 22.5 ML/M2
BH CV ECHO MEAS - SVI(MOD-SP4): 31.5 ML/M2
BH CV ECHO MEAS - TAPSE (>1.6): 1.42 CM
BH CV ECHO MEAS - TR MAX PG: 14.6 MMHG
BH CV ECHO MEAS - TR MAX VEL: 191 CM/SEC
BH CV ECHO MEASUREMENTS AVERAGE E/E' RATIO: 10.7
BH CV REST NUCLEAR ISOTOPE DOSE: 11 MCI
BH CV STRESS BP STAGE 1: NORMAL
BH CV STRESS BP STAGE 2: NORMAL
BH CV STRESS COMMENTS STAGE 1: NORMAL
BH CV STRESS COMMENTS STAGE 2: NORMAL
BH CV STRESS DOSE REGADENOSON STAGE 1: 0.4
BH CV STRESS DURATION MIN STAGE 1: 0
BH CV STRESS DURATION MIN STAGE 2: 4
BH CV STRESS DURATION SEC STAGE 1: 10
BH CV STRESS DURATION SEC STAGE 2: 0
BH CV STRESS HR STAGE 1: 98
BH CV STRESS HR STAGE 2: 105
BH CV STRESS NUCLEAR ISOTOPE DOSE: 31 MCI
BH CV STRESS PROTOCOL 1: NORMAL
BH CV STRESS RECOVERY BP: NORMAL MMHG
BH CV STRESS RECOVERY HR: 104 BPM
BH CV STRESS STAGE 1: 1
BH CV STRESS STAGE 2: 2
BH CV XLRA - RV BASE: 4.3 CM
BH CV XLRA - RV LENGTH: 7.3 CM
BH CV XLRA - RV MID: 3.4 CM
BH CV XLRA - TDI S': 9 CM/SEC
BH CV XLRA MEAS LEFT DIST CCA EDV: 10.1 CM/SEC
BH CV XLRA MEAS LEFT DIST CCA PSV: 54 CM/SEC
BH CV XLRA MEAS LEFT DIST ICA EDV: -16.1 CM/SEC
BH CV XLRA MEAS LEFT DIST ICA PSV: -70.6 CM/SEC
BH CV XLRA MEAS LEFT ICA/CCA RATIO: 0.84
BH CV XLRA MEAS LEFT PROX CCA PSV: 84.5 CM/SEC
BH CV XLRA MEAS LEFT PROX ECA PSV: 74.1 CM/SEC
BH CV XLRA MEAS LEFT PROX ICA EDV: -13.7 CM/SEC
BH CV XLRA MEAS LEFT PROX ICA PSV: -63.5 CM/SEC
BH CV XLRA MEAS LEFT PROX SCLA PSV: 140 CM/SEC
BH CV XLRA MEAS LEFT VERTEBRAL A EDV: -11.4 CM/SEC
BH CV XLRA MEAS LEFT VERTEBRAL A PSV: -40.4 CM/SEC
BH CV XLRA MEAS RIGHT DIST CCA EDV: -6.4 CM/SEC
BH CV XLRA MEAS RIGHT DIST CCA PSV: -59.5 CM/SEC
BH CV XLRA MEAS RIGHT DIST ICA EDV: -9.8 CM/SEC
BH CV XLRA MEAS RIGHT DIST ICA PSV: -74.2 CM/SEC
BH CV XLRA MEAS RIGHT ICA/CCA RATIO: 0.97
BH CV XLRA MEAS RIGHT PROX CCA PSV: 76.2 CM/SEC
BH CV XLRA MEAS RIGHT PROX ECA PSV: -97.8 CM/SEC
BH CV XLRA MEAS RIGHT PROX ICA EDV: -10.8 CM/SEC
BH CV XLRA MEAS RIGHT PROX ICA PSV: -61.9 CM/SEC
BH CV XLRA MEAS RIGHT PROX SCLA PSV: 104 CM/SEC
BH CV XLRA MEAS RIGHT VERTEBRAL A EDV: 5.3 CM/SEC
BH CV XLRA MEAS RIGHT VERTEBRAL A PSV: 35.1 CM/SEC
GLUCOSE BLDC GLUCOMTR-MCNC: 134 MG/DL (ref 70–105)
GLUCOSE BLDC GLUCOMTR-MCNC: 147 MG/DL (ref 70–105)
LEFT ATRIUM VOLUME INDEX: 26.4 ML/M2
LV EF NUC BP: 60 %
MAXIMAL PREDICTED HEART RATE: 137 BPM
PERCENT MAX PREDICTED HR: 77.37 %
SINUS: 4 CM
STRESS BASELINE BP: NORMAL MMHG
STRESS BASELINE HR: 87 BPM
STRESS PERCENT HR: 91 %
STRESS POST PEAK BP: NORMAL MMHG
STRESS POST PEAK HR: 106 BPM
STRESS TARGET HR: 116 BPM

## 2024-05-27 PROCEDURE — 82948 REAGENT STRIP/BLOOD GLUCOSE: CPT

## 2024-05-27 PROCEDURE — 25010000002 REGADENOSON 0.4 MG/5ML SOLUTION: Performed by: FAMILY MEDICINE

## 2024-05-27 PROCEDURE — 93880 EXTRACRANIAL BILAT STUDY: CPT | Performed by: SURGERY

## 2024-05-27 PROCEDURE — A9502 TC99M TETROFOSMIN: HCPCS | Performed by: FAMILY MEDICINE

## 2024-05-27 PROCEDURE — 93017 CV STRESS TEST TRACING ONLY: CPT

## 2024-05-27 PROCEDURE — 78452 HT MUSCLE IMAGE SPECT MULT: CPT | Performed by: STUDENT IN AN ORGANIZED HEALTH CARE EDUCATION/TRAINING PROGRAM

## 2024-05-27 PROCEDURE — 93880 EXTRACRANIAL BILAT STUDY: CPT

## 2024-05-27 PROCEDURE — 93018 CV STRESS TEST I&R ONLY: CPT | Performed by: STUDENT IN AN ORGANIZED HEALTH CARE EDUCATION/TRAINING PROGRAM

## 2024-05-27 PROCEDURE — 93356 MYOCRD STRAIN IMG SPCKL TRCK: CPT

## 2024-05-27 PROCEDURE — 93306 TTE W/DOPPLER COMPLETE: CPT

## 2024-05-27 PROCEDURE — 0 TECHNETIUM TETROFOSMIN KIT: Performed by: FAMILY MEDICINE

## 2024-05-27 PROCEDURE — G0378 HOSPITAL OBSERVATION PER HR: HCPCS

## 2024-05-27 PROCEDURE — 78452 HT MUSCLE IMAGE SPECT MULT: CPT

## 2024-05-27 PROCEDURE — 93356 MYOCRD STRAIN IMG SPCKL TRCK: CPT | Performed by: STUDENT IN AN ORGANIZED HEALTH CARE EDUCATION/TRAINING PROGRAM

## 2024-05-27 PROCEDURE — 93016 CV STRESS TEST SUPVJ ONLY: CPT

## 2024-05-27 PROCEDURE — 96372 THER/PROPH/DIAG INJ SC/IM: CPT

## 2024-05-27 PROCEDURE — 93306 TTE W/DOPPLER COMPLETE: CPT | Performed by: STUDENT IN AN ORGANIZED HEALTH CARE EDUCATION/TRAINING PROGRAM

## 2024-05-27 RX ORDER — LISINOPRIL 20 MG/1
40 TABLET ORAL DAILY
Status: DISCONTINUED | OUTPATIENT
Start: 2024-05-28 | End: 2024-05-27 | Stop reason: HOSPADM

## 2024-05-27 RX ORDER — GABAPENTIN 100 MG/1
100 CAPSULE ORAL 2 TIMES DAILY
Qty: 60 CAPSULE | Refills: 1 | Status: SHIPPED | OUTPATIENT
Start: 2024-05-27

## 2024-05-27 RX ORDER — ASPIRIN 81 MG/1
81 TABLET ORAL DAILY
Qty: 30 TABLET | Refills: 1 | Status: SHIPPED | OUTPATIENT
Start: 2024-05-28

## 2024-05-27 RX ORDER — DILTIAZEM HYDROCHLORIDE 300 MG/1
300 CAPSULE, COATED, EXTENDED RELEASE ORAL
Qty: 30 CAPSULE | Refills: 1 | Status: SHIPPED | OUTPATIENT
Start: 2024-05-28

## 2024-05-27 RX ORDER — REGADENOSON 0.08 MG/ML
0.4 INJECTION, SOLUTION INTRAVENOUS
Status: COMPLETED | OUTPATIENT
Start: 2024-05-27 | End: 2024-05-27

## 2024-05-27 RX ORDER — LISINOPRIL 40 MG/1
40 TABLET ORAL DAILY
Qty: 30 TABLET | Refills: 1 | Status: SHIPPED | OUTPATIENT
Start: 2024-05-28

## 2024-05-27 RX ADMIN — METFORMIN HYDROCHLORIDE 500 MG: 500 TABLET ORAL at 08:59

## 2024-05-27 RX ADMIN — TETROFOSMIN 1 DOSE: 1.38 INJECTION, POWDER, LYOPHILIZED, FOR SOLUTION INTRAVENOUS at 07:47

## 2024-05-27 RX ADMIN — TETROFOSMIN 1 DOSE: 1.38 INJECTION, POWDER, LYOPHILIZED, FOR SOLUTION INTRAVENOUS at 10:54

## 2024-05-27 RX ADMIN — Medication 10 ML: at 08:59

## 2024-05-27 RX ADMIN — DILTIAZEM HYDROCHLORIDE 240 MG: 240 CAPSULE, EXTENDED RELEASE ORAL at 08:58

## 2024-05-27 RX ADMIN — ASPIRIN 81 MG: 81 TABLET, COATED ORAL at 08:58

## 2024-05-27 RX ADMIN — GABAPENTIN 100 MG: 100 CAPSULE ORAL at 08:58

## 2024-05-27 RX ADMIN — REGADENOSON 0.4 MG: 0.08 INJECTION, SOLUTION INTRAVENOUS at 10:54

## 2024-05-27 RX ADMIN — LISINOPRIL 20 MG: 20 TABLET ORAL at 08:59

## 2024-05-27 NOTE — PLAN OF CARE
Problem: Syncope  Goal: Absence of Syncopal Symptoms  Outcome: Ongoing, Progressing     Problem: Breathing Pattern Ineffective  Goal: Effective Breathing Pattern  Outcome: Ongoing, Progressing  Intervention: Promote Improved Breathing Pattern  Recent Flowsheet Documentation  Taken 5/27/2024 1240 by Lorna Valdes RN  Head of Bed (HOB) Positioning: HOB elevated  Taken 5/27/2024 0730 by Lorna Valdes RN  Head of Bed (HOB) Positioning: HOB elevated     Problem: Adult Inpatient Plan of Care  Goal: Plan of Care Review  Outcome: Ongoing, Progressing  Flowsheets (Taken 5/27/2024 1447)  Plan of Care Reviewed With:   patient   significant other  Goal: Patient-Specific Goal (Individualized)  Outcome: Ongoing, Progressing  Goal: Absence of Hospital-Acquired Illness or Injury  Outcome: Ongoing, Progressing  Intervention: Identify and Manage Fall Risk  Recent Flowsheet Documentation  Taken 5/27/2024 1400 by Lorna Valdes RN  Safety Promotion/Fall Prevention:   assistive device/personal items within reach   clutter free environment maintained   safety round/check completed   nonskid shoes/slippers when out of bed  Taken 5/27/2024 1240 by Lorna Valdes RN  Safety Promotion/Fall Prevention:   assistive device/personal items within reach   clutter free environment maintained   safety round/check completed   nonskid shoes/slippers when out of bed  Taken 5/27/2024 1000 by Lorna Valdes RN  Safety Promotion/Fall Prevention: patient off unit  Taken 5/27/2024 0800 by Lorna Valdes RN  Safety Promotion/Fall Prevention: patient off unit  Taken 5/27/2024 0730 by Lorna Valdes RN  Safety Promotion/Fall Prevention:   assistive device/personal items within reach   clutter free environment maintained   safety round/check completed   nonskid shoes/slippers when out of bed  Intervention: Prevent Skin Injury  Recent Flowsheet Documentation  Taken 5/27/2024 1240 by Lorna Valdes RN  Body Position:  position changed independently  Taken 5/27/2024 0730 by Lorna Valdes RN  Body Position: position changed independently  Intervention: Prevent and Manage VTE (Venous Thromboembolism) Risk  Recent Flowsheet Documentation  Taken 5/27/2024 0730 by Lorna Valdes RN  Activity Management: ambulated to bathroom  Goal: Optimal Comfort and Wellbeing  Outcome: Ongoing, Progressing  Intervention: Provide Person-Centered Care  Recent Flowsheet Documentation  Taken 5/27/2024 1240 by Lorna Valdes RN  Trust Relationship/Rapport:   care explained   questions answered   questions encouraged  Taken 5/27/2024 0730 by Lorna Valdes RN  Trust Relationship/Rapport:   care explained   questions answered   questions encouraged  Goal: Readiness for Transition of Care  Outcome: Ongoing, Progressing     Problem: Fall Injury Risk  Goal: Absence of Fall and Fall-Related Injury  Outcome: Ongoing, Progressing  Intervention: Promote Injury-Free Environment  Recent Flowsheet Documentation  Taken 5/27/2024 1400 by Lorna Valdes RN  Safety Promotion/Fall Prevention:   assistive device/personal items within reach   clutter free environment maintained   safety round/check completed   nonskid shoes/slippers when out of bed  Taken 5/27/2024 1240 by Lorna Valdes RN  Safety Promotion/Fall Prevention:   assistive device/personal items within reach   clutter free environment maintained   safety round/check completed   nonskid shoes/slippers when out of bed  Taken 5/27/2024 1000 by Lorna Valdes RN  Safety Promotion/Fall Prevention: patient off unit  Taken 5/27/2024 0800 by Lorna Valdes RN  Safety Promotion/Fall Prevention: patient off unit  Taken 5/27/2024 0730 by Lorna Valdes RN  Safety Promotion/Fall Prevention:   assistive device/personal items within reach   clutter free environment maintained   safety round/check completed   nonskid shoes/slippers when out of bed   Goal Outcome Evaluation:  Plan  of Care Reviewed With: patient, significant other

## 2024-05-27 NOTE — PROGRESS NOTES
LOS: 0 days   Patient Care Team:  Flora Degroot MD as PCP - General (Internal Medicine)    Subjective:  Events noted    Objective:   Afebrile      Review of Systems:   Review of Systems   Constitutional:  Positive for activity change.           Vital Signs  Temp:  [97.5 °F (36.4 °C)-98.1 °F (36.7 °C)] 97.9 °F (36.6 °C)  Heart Rate:  [73-95] 81  Resp:  [14-22] 14  BP: (123-161)/(62-89) 161/89    Physical Exam:  Physical Exam  Vitals and nursing note reviewed.          Radiology:  XR Chest 1 View    Result Date: 5/26/2024  Impression: 1. Cardiomegaly. 2. No active pulmonary disease. Electronically Signed: Daniel Wagner MD  5/26/2024 11:21 AM EDT  Workstation ID: [id number]        Results Review:     I reviewed the patient's new clinical results.  I reviewed the patient's new imaging results and agree with the interpretation.    Medication Review:   Scheduled Meds:aspirin, 81 mg, Oral, Daily  dilTIAZem CD, 240 mg, Oral, Q24H  enoxaparin, 40 mg, Subcutaneous, Daily  gabapentin, 100 mg, Oral, BID  insulin lispro, 2-9 Units, Subcutaneous, 4x Daily AC & at Bedtime  lisinopril, 20 mg, Oral, Daily  metFORMIN, 500 mg, Oral, BID With Meals  pantoprazole, 40 mg, Oral, Q AM  sodium chloride, 10 mL, Intravenous, Q12H      Continuous Infusions:sodium chloride, 20 mL/hr, Last Rate: 20 mL/hr (05/26/24 1010)      PRN Meds:.  acetaminophen    senna-docusate sodium **AND** polyethylene glycol **AND** bisacodyl **AND** bisacodyl    Calcium Replacement - Follow Nurse / BPA Driven Protocol    dextrose    dextrose    glucagon (human recombinant)    Magnesium Standard Dose Replacement - Follow Nurse / BPA Driven Protocol    nitroglycerin    ondansetron ODT    Phosphorus Replacement - Follow Nurse / BPA Driven Protocol    Potassium Replacement - Follow Nurse / BPA Driven Protocol    [COMPLETED] Insert Peripheral IV **AND** sodium chloride    sodium chloride    sodium chloride    Labs:    CBC    Results from last 7 days   Lab Units  05/25/24  1610   WBC 10*3/mm3 6.11   HEMOGLOBIN g/dL 13.3   PLATELETS 10*3/mm3 222     BMP   Results from last 7 days   Lab Units 05/25/24  1651   SODIUM mmol/L 137   POTASSIUM mmol/L 3.9   CHLORIDE mmol/L 101   CO2 mmol/L 24.4   BUN mg/dL 21   CREATININE mg/dL 1.08   GLUCOSE mg/dL 113*     Cr Clearance Estimated Creatinine Clearance: 65.1 mL/min (by C-G formula based on SCr of 1.08 mg/dL).  Coag     HbA1C   Lab Results   Component Value Date    HGBA1C 6.0 (H) 03/29/2024     Blood Glucose   Glucose   Date/Time Value Ref Range Status   05/26/2024 2120 109 (H) 70 - 105 mg/dL Final     Comment:     Serial Number: [id number]:  [id number]   05/26/2024 1730 146 (H) 70 - 105 mg/dL Final     Comment:     Serial Number: [id number]:  [id number]   05/26/2024 1138 161 (H) 70 - 105 mg/dL Final     Comment:     Serial Number: [id number]:  [id number]   05/26/2024 0714 120 (H) 70 - 105 mg/dL Final     Comment:     Serial Number: [id number]:  [id number]   05/25/2024 2345 184 (H) 70 - 105 mg/dL Final     Comment:     Serial Number: [id number]:  [id number]     Infection     CMP   Results from last 7 days   Lab Units 05/25/24  1651   SODIUM mmol/L 137   POTASSIUM mmol/L 3.9   CHLORIDE mmol/L 101   CO2 mmol/L 24.4   BUN mg/dL 21   CREATININE mg/dL 1.08   GLUCOSE mg/dL 113*   ALBUMIN g/dL 4.1   BILIRUBIN mg/dL 0.6   ALK PHOS U/L 88   AST (SGOT) U/L 14   ALT (SGPT) U/L 8     UA    Results from last 7 days   Lab Units 05/26/24  1239   NITRITE UA  Negative   WBC UA /HPF 0-2   BACTERIA UA /HPF None Seen   SQUAM EPITHEL UA /HPF 0-2     Radiology(recent) XR Chest 1 View    Result Date: 5/26/2024  Impression: 1. Cardiomegaly. 2. No active pulmonary disease. 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

## 2024-05-27 NOTE — PLAN OF CARE
Goal Outcome Evaluation:  Plan of Care Reviewed With: patient           Outcome Evaluation: pt voiced no concerns during the shift; NPO after midnight for stress test in am; pending 2D echo along with duplex; up ad lorelei; continue to monitor

## 2024-05-27 NOTE — PLAN OF CARE
Goal Outcome Evaluation:         Pt to d/c home with girlfriend

## 2024-05-28 NOTE — CASE MANAGEMENT/SOCIAL WORK
Case Management Discharge Note      Final Note: Routine home.         Selected Continued Care - Discharged on 5/27/2024 Admission date: 5/25/2024 - Discharge disposition: Home or Self Care     Transportation Services  Private: Car    Final Discharge Disposition Code: 01 - home or self-care

## 2024-06-01 NOTE — DISCHARGE SUMMARY
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          Hospital Course  Patient is a 84 y.o. male who presented with acute syncope.  He was evaluated by cardiology and risk factors were assessed.  Medications were modified and he underwent 2D echo, carotid U/S, and stress testing which failed to identify acute abnormality.  He did well off Alpha blockade and was deemed stable for D/C home with medications per the D/C reconciliation.  He will F/U with us in 1-2 weeks    Procedures Performed         Consults:   Consults       No orders found from 4/26/2024 to 5/26/2024.            Pertinent Test Results:XR Chest 1 View    Result Date: 5/26/2024  Impression: 1. Cardiomegaly. 2. No active pulmonary disease. Electronically Signed: Daniel Wagner MD  5/26/2024 11:21 AM EDT  Workstation ID: [id number]     Imaging Results (Last 7 Days)       Procedure Component Value Units Date/Time    XR Chest 1 View [[id number]] Collected: 05/26/24 1118     Updated: 05/26/24 1124    Narrative:      XR CHEST 1 VW    Date of Exam: 5/26/2024 11:10 AM EDT    Indication: Syncope    Comparison: 10/18/2015.    Findings:  The heart is enlarged. The patient has had a previous CABG procedure.  The pulmonary vascular markings are normal. The lungs and pleural spaces are felt to be clear of active disease. There are chronic age-related changes involving the bony thorax and   thoracic aorta.      Impression:      Impression:    1. Cardiomegaly.  2. No active pulmonary disease.      Electronically Signed: Daniel Wagner MD    5/26/2024 11:21 AM EDT    Workstation ID: [id number]                Condition on Discharge:  Fair    Vital Signs       Physical Exam:     General Appearance:    Alert, cooperative, in no acute distress   Head:    Normocephalic, without obvious abnormality, atraumatic   Eyes:           Conjunctivae and sclerae normal, no   icterus, no pallor, corneas clear, PERRLA   Throat:   No oral lesions, no thrush, oral mucosa moist   Neck:   No adenopathy, supple, trachea midline, no thyromegaly, no   carotid bruit, no JVD   Lungs:     Clear to auscultation,respirations regular, even and                  unlabored    Heart:    Regular rhythm and normal rate, normal S1 and S2, no            murmur, no gallop, no rub, no click   Chest Wall:    No abnormalities observed   Abdomen:     Normal bowel sounds, no masses, no organomegaly, soft        non-tender, non-distended, no guarding, no rebound                tenderness   Rectal:     Deferred   Extremities:   Moves all extremities well, no edema, no cyanosis, no             redness   Pulses:   Pulses palpable and equal bilaterally   Skin:   No bleeding, bruising or rash   Lymph nodes:   No palpable adenopathy   Neurologic:   Cranial nerves 2 - 12 grossly intact, sensation intact, DTR       present and equal bilaterally         Discharge Disposition  Home or Self Care    Discharge Medications     Discharge Medications        New Medications        Instructions Start Date   aspirin 81 MG EC tablet   81 mg, Oral, Daily      dilTIAZem  MG 24 hr capsule  Commonly known as: CARDIZEM CD  Replaces: dilTIAZem  MG 24 hr capsule   300 mg, Oral, Every 24  Hours Scheduled             Changes to Medications        Instructions Start Date   gabapentin 100 MG capsule  Commonly known as: NEURONTIN  What changed:   medication strength  how much to take   100 mg, Oral, 2 Times Daily      lisinopril 40 MG tablet  Commonly known as: PRINIVIL,ZESTRIL  What changed:   medication strength  how much to take   40 mg, Oral, Daily             Continue These Medications        Instructions Start Date   metFORMIN 500 MG tablet  Commonly known as: GLUCOPHAGE   500 mg, Oral, 2 Times Daily With Meals      omeprazole 40 MG capsule  Commonly known as: priLOSEC   40 mg, Oral, Daily      tamsulosin 0.4 MG capsule 24 hr capsule  Commonly known as: FLOMAX   1 capsule, Oral, Daily             Stop These Medications      dilTIAZem  MG 24 hr capsule  Commonly known as: DILACOR XR  Replaced by: dilTIAZem  MG 24 hr capsule     hydroCHLOROthiazide 25 MG tablet              Discharge Diet:   Diet Instructions       Diet: Regular/House Diet; Regular (IDDSI 7); Thin (IDDSI 0)      Discharge Diet: Regular/House Diet    Texture: Regular (IDDSI 7)    Fluid Consistency: Thin (IDDSI 0)            Activity at Discharge:   Activity Instructions       Gradually Increase Activity Until at Pre-Hospitalization Level              Follow-up Appointments  No future appointments.  Additional Instructions for the Follow-ups that You Need to Schedule       Discharge Follow-up with PCP   As directed       Currently Documented PCP:    Flora Degroot MD    PCP Phone Number:    [phone]     Follow Up Details: call for follow up in 2 weeks        Discharge Follow-up with Specified Provider: Cardiology; 3 Weeks   As directed      To: Cardiology   Follow Up: 3 Weeks                Test Results Pending at Discharge       Daniel Coates MD  05/31/24  21:29 EDT

## 2025-01-10 NOTE — PROGRESS NOTES
"Subjective   History of Present Illness: Varinder Sheffield is a 84 y.o. male is being seen for consultation today at the request of Self Referring for acute onset of bettering left-sided low back and leg pain that began around [place] time.  This pain started a couple days after returning home from his daughters were he had gotten up on a ladder and was trying to help fix her HVAC system.  He describes posterior lateral left leg pain down to the lateral part of his calf.  His pain significantly worse sitting or laying down.  He reports no significant paresthesias.  He was placed on a course of steroids and his symptoms are significantly better to almost resolved.  He does not describe any chronicity to this.  He underwent CT imaging and is here for evaluation.  He describes no bowel or bladder dysfunction or saddle anesthesia.  History of Present Illness    The following portions of the patient's history were reviewed and updated as appropriate: allergies, current medications, past family history, past medical history, past social history, past surgical history, and problem list.    Review of Systems   Constitutional:  Positive for activity change.   HENT: Negative.     Respiratory: Negative.     Cardiovascular: Negative.    Gastrointestinal: Negative.    Endocrine: Negative.    Genitourinary: Negative.    Musculoskeletal:  Positive for arthralgias, back pain (left sided lower/buttock/left leg) and myalgias.   Skin: Negative.    Allergic/Immunologic: Negative.    Neurological:  Positive for weakness and numbness (left leg).   Hematological: Negative.    Psychiatric/Behavioral:  Positive for sleep disturbance.    All other systems reviewed and are negative.      Objective     /75 (BP Location: Left arm, Patient Position: Sitting)   Pulse 95   Ht 180.3 cm (71\")   Wt 113 kg (249 lb)   SpO2 94%   BMI 34.73 kg/m²    Body mass index is 34.73 kg/m².    Vitals:    01/14/25 1007   PainSc:   1          Physical " Exam    Extremity motor strength 5/5      Assessment & Plan   Independent Review of Radiographic Studies:      I personally reviewed the images from the following studies.    CT lumbar spine 1/3/2025    Multilevel degenerative changes mainly noted at L5-S1 with chronic pars defects and resultant spondylolisthesis.  There appears to be component of foraminal stenosis along this level as well.    Medical Decision Making:      Varinder GRANDA Vivian a 84 y.o. male with medical history began for CAD, atrial fibrillation and status post left total knee replacement for 2024 being seen today for acute resolving left radiculopathy.  Imaging reviewed demonstrates moderate degenerative disc changes with spondylolysis at L5-S1 with resultant anterolisthesis.  Significant facet arthropathy throughout.  Does not appear to have any significant canal stenosis on nondedicated imaging but likely a component of foraminal stenosis at L5-S1 secondary to above.  His findings appear to be chronic and with his symptoms bettering, recommendations would include engaging in course of physical therapy for core strengthening to help mitigate the chance irritating the nerve again.  Patient does have an MRI pending but no follow-up is needed at this time unless patient's symptoms increase or return.  At which time targeted injection therapy would likely be recommended.  Patient agrees to plan of care and wishes to proceed.  I encouraged him call the office any questions or concerns.    Diagnoses and all orders for this visit:    1. Acute left lumbar radiculopathy (Primary)  -     Ambulatory Referral to Physical Therapy for Evaluation & Treatment    2. Congenital spondylolysis, lumbosacral region    3. Congenital spondylolisthesis    4. 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    This patient was examined wearing appropriate personal protective equipment.     Varinder  JESUS ALBERTO Sheffield  reports that he has never smoked. He has never been exposed to tobacco smoke. He has never used smokeless tobacco.   Body mass index is 34.73 kg/m².      Patient's blood pressure was reviewed.  Recommendations for  a low-salt diet and exercise to maintain/improve BP in addition to taking any presribed medications.    Advance Care Planning   ACP discussion was held with the patient during this visit. Patient has an advance directive (not in EMR), copy requested.             Leanne Charles DNP, APRN    01/14/25  11:03 EST

## 2025-01-14 ENCOUNTER — OFFICE VISIT (OUTPATIENT)
Dept: NEUROSURGERY | Facility: CLINIC | Age: 85
End: 2025-01-14
Payer: MEDICARE

## 2025-01-14 ENCOUNTER — ANCILLARY ORDERS (OUTPATIENT)
Dept: NEUROSURGERY | Facility: CLINIC | Age: 85
End: 2025-01-14

## 2025-01-14 VITALS
HEIGHT: 71 IN | SYSTOLIC BLOOD PRESSURE: 136 MMHG | DIASTOLIC BLOOD PRESSURE: 75 MMHG | OXYGEN SATURATION: 94 % | HEART RATE: 95 BPM | BODY MASS INDEX: 34.86 KG/M2 | WEIGHT: 249 LBS

## 2025-01-14 DIAGNOSIS — Q76.2 CONGENITAL SPONDYLOLISTHESIS: ICD-10-CM

## 2025-01-14 DIAGNOSIS — M54.16 ACUTE LEFT LUMBAR RADICULOPATHY: Primary | ICD-10-CM

## 2025-01-14 DIAGNOSIS — M51.361 DEGENERATION OF INTERVERTEBRAL DISC OF LUMBAR REGION WITH LOWER EXTREMITY PAIN: ICD-10-CM

## 2025-01-14 DIAGNOSIS — Q76.2 CONGENITAL SPONDYLOLYSIS, LUMBOSACRAL REGION: ICD-10-CM

## 2025-01-14 PROCEDURE — 1159F MED LIST DOCD IN RCRD: CPT | Performed by: NURSE PRACTITIONER

## 2025-01-14 PROCEDURE — 99203 OFFICE O/P NEW LOW 30 MIN: CPT | Performed by: NURSE PRACTITIONER

## 2025-01-14 PROCEDURE — 1160F RVW MEDS BY RX/DR IN RCRD: CPT | Performed by: NURSE PRACTITIONER

## 2025-01-14 RX ORDER — LIDOCAINE 50 MG/G
PATCH TOPICAL
COMMUNITY
Start: 2025-01-04

## 2025-04-29 ENCOUNTER — OFFICE (AMBULATORY)
Dept: URBAN - METROPOLITAN AREA CLINIC 64 | Facility: CLINIC | Age: 85
End: 2025-04-29
Payer: COMMERCIAL

## 2025-04-29 VITALS
WEIGHT: 262 LBS | HEART RATE: 83 BPM | SYSTOLIC BLOOD PRESSURE: 127 MMHG | HEIGHT: 71 IN | DIASTOLIC BLOOD PRESSURE: 75 MMHG

## 2025-04-29 DIAGNOSIS — D50.0 IRON DEFICIENCY ANEMIA SECONDARY TO BLOOD LOSS (CHRONIC): ICD-10-CM

## 2025-04-29 DIAGNOSIS — K57.30 DIVERTICULOSIS OF LARGE INTESTINE WITHOUT PERFORATION OR ABS: ICD-10-CM

## 2025-04-29 PROCEDURE — 99204 OFFICE O/P NEW MOD 45 MIN: CPT | Performed by: INTERNAL MEDICINE
